# Patient Record
Sex: FEMALE | Race: BLACK OR AFRICAN AMERICAN | NOT HISPANIC OR LATINO | Employment: OTHER | URBAN - METROPOLITAN AREA
[De-identification: names, ages, dates, MRNs, and addresses within clinical notes are randomized per-mention and may not be internally consistent; named-entity substitution may affect disease eponyms.]

---

## 2020-12-02 ENCOUNTER — TELEPHONE (OUTPATIENT)
Dept: HEMATOLOGY/ONCOLOGY | Facility: CLINIC | Age: 85
End: 2020-12-02

## 2020-12-02 NOTE — TELEPHONE ENCOUNTER
Spoke with Laura who is requesting appt with Dr Sargent in [place].  Laura reports patient was diagnosed with ovarian cancer via biopsy at [facility] and has been seen at [facility].  No referral or records in system.  Laura to contact medical records at both facilities to see if release needs to be signed by patient.  This nurse to request records as well at both facilities in AM.    Appt tentatively scheduled for 12/14 with Dr. Sargent pending record receipt.  Laura verbalized understanding.  No further questions/concerns noted at this time.

## 2020-12-02 NOTE — TELEPHONE ENCOUNTER
Pts granddaughter, Laura, would like the pt to be scheduled sooner than what is available at [place].  Advised she was willing to be seen in [place].  Laura advised patient was seen in [facility] ED and diagnosed with ovarian cancer there.  Pt granddaughter made aware we are unable to see imaging from [facility].  She would like to bring pt in to discuss treatment options.  Thanks.

## 2020-12-02 NOTE — TELEPHONE ENCOUNTER
----- Message from Inez Roman sent at 12/2/2020  2:37 PM CST -----  Type:  Sooner Apoointment Request    Caller is requesting a sooner appointment.  Caller declined first available appointment listed below.  Caller will not accept being placed on the waitlist and is requesting a message be sent to doctor.    Name of Caller:  Laura Campos (Grandchild)  When is the first available appointment?  1/4/21  Symptoms:  Ovarian Cancer  Best Call Back Number:  [phone]  Additional Information:  Calling to schedule sooner appointment if possible. Willing to go to either [facility] or [place].

## 2020-12-03 ENCOUNTER — DOCUMENTATION ONLY (OUTPATIENT)
Dept: HEMATOLOGY/ONCOLOGY | Facility: CLINIC | Age: 85
End: 2020-12-03

## 2020-12-03 NOTE — NURSING
Chart review.  Patient saw heme/onc Dr. Pozo at [facility].  Records request faxed to Dr. Pozo's office with successful fax confirmation:

## 2020-12-04 ENCOUNTER — TELEPHONE (OUTPATIENT)
Dept: HEMATOLOGY/ONCOLOGY | Facility: CLINIC | Age: 85
End: 2020-12-04

## 2020-12-04 NOTE — TELEPHONE ENCOUNTER
Spoke with Laura to inform records have been received from Dr. Pozo's office.  Patient scheduled for 12/14/2020 NP appt with Dr Sargent.   Address to this clinic given. Laura instructed to have patient arrive no more than 10 minutes early and bring her picture ID and insurance card(s).   Laura verbalized understanding.  No further questions/concerns noted at this time.

## 2020-12-08 ENCOUNTER — TELEPHONE (OUTPATIENT)
Dept: HEMATOLOGY/ONCOLOGY | Facility: CLINIC | Age: 85
End: 2020-12-08

## 2020-12-08 NOTE — TELEPHONE ENCOUNTER
Spoke with Laura and r/s NP appt to 12/15 @ 320 per request. Laura verbalized understanding.  No further questions/concerns noted at this time.      ----- Message from Marcie Casper RN sent at 12/7/2020  2:26 PM CST -----  Regarding: requests new appt day  Granddaughter called to request New Pt  Appt with Dr Quiroz on 12/15/20.   Pt currently has appt on 12/14/20  New call back # [phone]

## 2020-12-15 ENCOUNTER — DOCUMENTATION ONLY (OUTPATIENT)
Dept: HEMATOLOGY/ONCOLOGY | Facility: CLINIC | Age: 85
End: 2020-12-15

## 2020-12-15 ENCOUNTER — OFFICE VISIT (OUTPATIENT)
Dept: HEMATOLOGY/ONCOLOGY | Facility: CLINIC | Age: 85
End: 2020-12-15
Payer: MEDICARE

## 2020-12-15 VITALS
OXYGEN SATURATION: 96 % | BODY MASS INDEX: 29.99 KG/M2 | SYSTOLIC BLOOD PRESSURE: 125 MMHG | HEART RATE: 70 BPM | RESPIRATION RATE: 20 BRPM | HEIGHT: 65 IN | TEMPERATURE: 99 F | DIASTOLIC BLOOD PRESSURE: 58 MMHG | WEIGHT: 180 LBS

## 2020-12-15 DIAGNOSIS — Z74.1 REQUIRES ASSISTANCE WITH ACTIVITIES OF DAILY LIVING (ADL): ICD-10-CM

## 2020-12-15 DIAGNOSIS — Z51.5 END OF LIFE CARE: ICD-10-CM

## 2020-12-15 DIAGNOSIS — C56.9 MALIGNANT NEOPLASM OF OVARY, UNSPECIFIED LATERALITY: Primary | ICD-10-CM

## 2020-12-15 PROCEDURE — 99999 PR PBB SHADOW E&M-EST. PATIENT-LVL III: CPT | Mod: PBBFAC,,, | Performed by: INTERNAL MEDICINE

## 2020-12-15 PROCEDURE — 99999 PR PBB SHADOW E&M-EST. PATIENT-LVL III: ICD-10-PCS | Mod: PBBFAC,,, | Performed by: INTERNAL MEDICINE

## 2020-12-15 PROCEDURE — 99213 OFFICE O/P EST LOW 20 MIN: CPT | Mod: PBBFAC,PO | Performed by: INTERNAL MEDICINE

## 2020-12-15 RX ORDER — CARVEDILOL 3.12 MG/1
3.12 TABLET ORAL DAILY
COMMUNITY
Start: 2020-12-01

## 2020-12-15 RX ORDER — IPRATROPIUM BROMIDE 0.5 MG/2.5ML
0.02 SOLUTION RESPIRATORY (INHALATION) DAILY PRN
COMMUNITY
Start: 2020-12-01

## 2020-12-15 RX ORDER — MEMANTINE HYDROCHLORIDE 5 MG/1
5 TABLET ORAL DAILY
COMMUNITY

## 2020-12-15 RX ORDER — ARFORMOTEROL TARTRATE 15 UG/2ML
1 SOLUTION RESPIRATORY (INHALATION) DAILY PRN
COMMUNITY
Start: 2020-12-01

## 2020-12-15 RX ORDER — OMEPRAZOLE 20 MG/1
20 TABLET, ORALLY DISINTEGRATING, DELAYED RELEASE ORAL DAILY
COMMUNITY
Start: 2020-06-23

## 2020-12-15 RX ORDER — LOVASTATIN 20 MG/1
20 TABLET ORAL DAILY
COMMUNITY
Start: 2020-12-01

## 2020-12-15 NOTE — NURSING
Met with pt and granddaughter (as reported) during OV with Dr. Sargent.  Per Dr. Sargent patient's ECOG makes pt ineligible for treatment and recommended hospice.  Patient's granddaughter, Laura states they do have a hospice company back home.  This nurse will follow up in 24-48 hours to make sure they have resources needed.

## 2020-12-28 ENCOUNTER — TELEPHONE (OUTPATIENT)
Dept: HEMATOLOGY/ONCOLOGY | Facility: CLINIC | Age: 85
End: 2020-12-28

## 2020-12-28 NOTE — TELEPHONE ENCOUNTER
----- Message from Leanna Rios RN sent at 12/15/2020  4:38 PM CST -----  Regarding: call [facility]  Hospice set up?     Need tyrone to call

## 2020-12-28 NOTE — TELEPHONE ENCOUNTER
----- Message from Leela Aparicio sent at 12/28/2020  2:47 PM CST -----  Regarding: return call  Contact: Karla/Laura/[phone]  Type:  Patient Returning Call    Who Called:  Evy/[phone]  Who Left Message for Patient:  Leanna  Does the patient know what this is regarding?:  no

## 2020-12-28 NOTE — TELEPHONE ENCOUNTER
Attempted to contact [facility] to check on patient and see if they were able to get hospice referral in [place].  Unable to contact.  No voicemail set up. Unable to leave message.

## 2020-12-29 ENCOUNTER — TELEPHONE (OUTPATIENT)
Dept: HEMATOLOGY/ONCOLOGY | Facility: CLINIC | Age: 85
End: 2020-12-29

## 2020-12-29 NOTE — TELEPHONE ENCOUNTER
----- Message from Timbo Cole sent at 12/29/2020  4:30 PM CST -----  Type:  Patient Returning Call    Who Called:  Andreaenrique Campos (Grandchild)  Who Left Message for Patient:  Shari Bindu  Does the patient know what this is regarding?:  no  Best Call Back Number:  [phone]  Additional Information:  NA

## 2020-12-29 NOTE — TELEPHONE ENCOUNTER
Pts grand child states that she is set up with [facility] hospice but they haven't done much for them. States pts fluid and increasing and she is in severe pain. States that Dr. Sargent discussed possibly putting a port in to relieve the fluid. Scheduled pt appt on 1/7 to discuss this with Dr. Sargent. Pts Grand child verbalized understanding.

## 2020-12-30 ENCOUNTER — TELEPHONE (OUTPATIENT)
Dept: HEMATOLOGY/ONCOLOGY | Facility: CLINIC | Age: 85
End: 2020-12-30

## 2020-12-30 NOTE — TELEPHONE ENCOUNTER
----- Message from Oneal Jackman sent at 12/30/2020  4:07 PM CST -----  Type:  Patient Returning Call    Who Called:  Laura Campos (Grandchild  Who Left Message for Patient:  Cindy  Does the patient know what this is regarding?:  Rescheduling appointment  Best Call Back Number:  [phone]  Additional Information:

## 2021-01-07 ENCOUNTER — TELEPHONE (OUTPATIENT)
Dept: HEMATOLOGY/ONCOLOGY | Facility: CLINIC | Age: 86
End: 2021-01-07

## 2021-01-08 ENCOUNTER — OFFICE VISIT (OUTPATIENT)
Dept: HEMATOLOGY/ONCOLOGY | Facility: CLINIC | Age: 86
End: 2021-01-08
Payer: MEDICARE

## 2021-01-08 DIAGNOSIS — C56.9 MALIGNANT NEOPLASM OF OVARY, UNSPECIFIED LATERALITY: Primary | ICD-10-CM

## 2021-01-08 PROCEDURE — S0257 END OF LIFE COUNSELING: HCPCS | Mod: 95,,, | Performed by: INTERNAL MEDICINE

## 2021-01-08 PROCEDURE — S0257 PR END OF LIFE COUNSELING: ICD-10-PCS | Mod: 95,,, | Performed by: INTERNAL MEDICINE

## 2021-01-08 PROCEDURE — 99214 OFFICE O/P EST MOD 30 MIN: CPT | Mod: 95,,, | Performed by: INTERNAL MEDICINE

## 2021-01-08 PROCEDURE — 1159F PR MEDICATION LIST DOCUMENTED IN MEDICAL RECORD: ICD-10-PCS | Mod: 95,,, | Performed by: INTERNAL MEDICINE

## 2021-01-08 PROCEDURE — 99214 PR OFFICE/OUTPT VISIT, EST, LEVL IV, 30-39 MIN: ICD-10-PCS | Mod: 95,,, | Performed by: INTERNAL MEDICINE

## 2021-01-08 PROCEDURE — 1159F MED LIST DOCD IN RCRD: CPT | Mod: 95,,, | Performed by: INTERNAL MEDICINE
